# Patient Record
(demographics unavailable — no encounter records)

---

## 2025-04-14 NOTE — DEVELOPMENTAL MILESTONES

## 2025-04-14 NOTE — PHYSICAL EXAM
[Alert] : alert [No Acute Distress] : no acute distress [Playful] : playful [Normocephalic] : normocephalic [Conjunctivae with no discharge] : conjunctivae with no discharge [EOMI Bilateral] : EOMI bilateral [Auricles Well Formed] : auricles well formed [Clear Tympanic membranes with present light reflex and bony landmarks] : clear tympanic membranes with present light reflex and bony landmarks [No Discharge] : no discharge [Nares Patent] : nares patent [Nonerythematous Oropharynx] : nonerythematous oropharynx [Supple, full passive range of motion] : supple, full passive range of motion [No Palpable Masses] : no palpable masses [Symmetric Chest Rise] : symmetric chest rise [Clear to Auscultation Bilaterally] : clear to auscultation bilaterally [Regular Rate and Rhythm] : regular rate and rhythm [Normal S1, S2 present] : normal S1, S2 present [No Murmurs] : no murmurs [+2 Femoral Pulses] : +2 femoral pulses [Soft] : soft [Non Distended] : non distended [Boris 1] : Boris 1 [No Abnormal Lymph Nodes Palpated] : no abnormal lymph nodes palpated [Symmetric Buttocks Creases] : symmetric buttocks creases [No Gait Asymmetry] : no gait asymmetry [Normal Muscle Tone] : normal muscle tone [Cranial Nerves Grossly Intact] : cranial nerves grossly intact [No Rash or Lesions] : no rash or lesions

## 2025-04-14 NOTE — HISTORY OF PRESENT ILLNESS
[Potential consequences of obesity discussed] : Potential consequences of obesity discussed [Benefits of weight loss discussed] : Benefits of weight loss discussed [Encouraged to increase physical activity] : Encouraged to increase physical activity [Decrease Portions] : decrease portions [None] : None [Good understanding] : Patient has a good understanding of lifestyle changes and steps needed to achieve self management goal [Mother] : mother [2% ___ oz/d] : consumes [unfilled] oz of 2% cow's milk per day [Sugar drinks] : sugar drinks [Fruit] : fruit [Vegetables] : vegetables [Meat] : meat [Grains] : grains [Eggs] : eggs [Fish] : fish [Dairy] : dairy [___ stools per day] : [unfilled]  stools per day [___ voids per day] : [unfilled] voids per day [Normal] : Normal [In bed] : In bed [Sippy cup use] : Sippy cup use [Brushing teeth] : Brushing teeth [Yes] : Patient goes to dentist yearly [Toothpaste] : Primary Fluoride Source: Toothpaste [In nursery school] : In nursery school [Playtime (60 min/d)] : Playtime 60 min a day [Appropiate parent-child communication] : Appropriate parent-child communication [Child given choices] : Child given choices [Child Cooperates] : Child cooperates [Parent has appropriate responses to behavior] : Parent has appropriate responses to behavior [No] : Not at  exposure [Water heater temperature set at <120 degrees F] : Water heater temperature set at <120 degrees F [Car seat in back seat] : Car seat in back seat [Smoke Detectors] : Smoke detectors [Supervised play near cars and streets] : Supervised play near cars and streets [Carbon Monoxide Detectors] : Carbon monoxide detectors [Up to date] : Up to date [Exposure to electronic nicotine delivery system] : No exposure to electronic nicotine delivery system

## 2025-04-14 NOTE — DISCUSSION/SUMMARY
[Normal Growth] : growth [Normal Development] : development [No Elimination Concerns] : elimination [No Skin Concerns] : skin [Family Support] : family support [Encouraging Literacy Activities] : encouraging literacy activities [Playing with Peers] : playing with peers [Promoting Physical Activity] : promoting physical activity [Safety] : safety [Mother] : mother [] : The components of the vaccine(s) to be administered today are listed in the plan of care. The disease(s) for which the vaccine(s) are intended to prevent and the risks have been discussed with the caretaker.  The risks are also included in the appropriate vaccination information statements which have been provided to the patient's caregiver.  The caregiver has given consent to vaccinate. [FreeTextEntry1] : --- 3 year old female here for routine well child check with age-appropriate growth, nutrition, and development.  Hearing screen with OAE was within normal limits. Vision screen with GoCheck was within normal limits.  - Referred to cardiology for further evaluation/management. Help is appreciated. - Discussed today's immunizations: Hepatitis A. Parental consent obtained. - Provided age-appropriate anticipatory guidance. - Return for 4 year old well child check, or sooner if concerns arise.

## 2025-05-08 NOTE — CLINICAL NARRATIVE
Laurel is a 21 year old who is being evaluated via a billable video visit.      How would you like to obtain your AVS? MyChart  If the video visit is dropped, the invitation should be resent by: Text to cell phone: [phone]  Will anyone else be joining your video visit? Lorena Sultana LPN     [Up to Date] : Up to Date unknown

## 2025-05-09 NOTE — CONSULT LETTER
[Today's Date] : [unfilled] [Name] : Name: [unfilled] [] : : ~~ [Today's Date:] : [unfilled] [Dear  ___:] : Dear Dr. [unfilled]: [Consult] : I had the pleasure of evaluating your patient, [unfilled]. My full evaluation follows. [Consult - Single Provider] : Thank you very much for allowing me to participate in the care of this patient. If you have any questions, please do not hesitate to contact me. [Sincerely,] : Sincerely, [FreeTextEntry4] : GUERLINE SCOTT MD [FreeTextEntry6] : [place] [FreeTextEntry7] : tel:[phone] [de-identified] : Nahun Rondon MD, FAAP, FACC  Pediatric Cardiologist  of Pediatrics [facility]

## 2025-05-09 NOTE — CONSULT LETTER
[Today's Date] : [unfilled] [Name] : Name: [unfilled] [] : : ~~ [Today's Date:] : [unfilled] [Dear  ___:] : Dear Dr. [unfilled]: [Consult] : I had the pleasure of evaluating your patient, [unfilled]. My full evaluation follows. [Consult - Single Provider] : Thank you very much for allowing me to participate in the care of this patient. If you have any questions, please do not hesitate to contact me. [Sincerely,] : Sincerely, [FreeTextEntry4] : GUERLINE SCOTT MD [FreeTextEntry6] : [place] [FreeTextEntry7] : tel:[phone] [de-identified] : Nahun Rondon MD, FAAP, FACC  Pediatric Cardiologist  of Pediatrics [facility]

## 2025-05-09 NOTE — CARDIOLOGY SUMMARY
[Today's Date] : [unfilled] [FreeTextEntry1] : Normal sinus rhythm without preexcitation or ectopy. Heart rate (bpm): 108 [FreeTextEntry2] : 1. Normal left ventricular size, morphology and systolic function. 2. Normal right ventricular morphology with qualitatively normal size and systolic function. 3. No pericardial effusion.

## 2025-05-09 NOTE — HISTORY OF PRESENT ILLNESS
[FreeTextEntry1] : DARREN is a 3 year female who presents for evaluation of a systolic heart murmur that was heard on a physical examination a few weeks ago. DARREN  was not ill at the time of the visit. DARREN is asymptomatic from a cardiac standpoint and she denies chest pain, palpitations, presyncope, syncope or exercise intolerance. There is no family history of congenital heart disease, sudden cardiac death or arrhythmia.

## 2025-05-09 NOTE — CONSULT LETTER
[Today's Date] : [unfilled] [Name] : Name: [unfilled] [] : : ~~ [Today's Date:] : [unfilled] [Dear  ___:] : Dear Dr. [unfilled]: [Consult] : I had the pleasure of evaluating your patient, [unfilled]. My full evaluation follows. [Consult - Single Provider] : Thank you very much for allowing me to participate in the care of this patient. If you have any questions, please do not hesitate to contact me. [Sincerely,] : Sincerely, [FreeTextEntry4] : GUERLINE SCOTT MD [FreeTextEntry6] : [place] [FreeTextEntry7] : tel:[phone] [de-identified] : Nahun Rondon MD, FAAP, FACC  Pediatric Cardiologist  of Pediatrics [facility]

## 2025-05-09 NOTE — DISCUSSION/SUMMARY
[FreeTextEntry1] : DARREN has an innocent murmur and a normal cardiac exam, electrocardiogram and echocardiogram.  I reassured the patient and her family that DARREN's heart is structurally and functionally normal and that the murmur heard does not represent a cardiac abnormality.  All physical activities may be performed without restriction and there is no need for routine follow-up unless future concerns arise.   [Needs SBE Prophylaxis] : [unfilled] does not need bacterial endocarditis prophylaxis [May participate in all age-appropriate activities] : [unfilled] May participate in all age-appropriate activities.
